# Patient Record
Sex: FEMALE | Race: WHITE | NOT HISPANIC OR LATINO | URBAN - METROPOLITAN AREA
[De-identification: names, ages, dates, MRNs, and addresses within clinical notes are randomized per-mention and may not be internally consistent; named-entity substitution may affect disease eponyms.]

---

## 2020-07-18 ENCOUNTER — TELEPHONE (OUTPATIENT)
Dept: URGENT CARE | Facility: CLINIC | Age: 30
End: 2020-07-18

## 2020-07-18 NOTE — TELEPHONE ENCOUNTER
Called patient, left a message on the cell #voicemail informing her of her negative Covid 19 test results

## 2021-02-01 ENCOUNTER — TRANSCRIBE ORDERS (OUTPATIENT)
Dept: ADMINISTRATIVE | Facility: HOSPITAL | Age: 31
End: 2021-02-01

## 2021-02-01 DIAGNOSIS — R10.32 LLQ ABDOMINAL PAIN: ICD-10-CM

## 2021-02-01 DIAGNOSIS — D72.829 LEUKOCYTOSIS, UNSPECIFIED TYPE: Primary | ICD-10-CM

## 2021-02-12 ENCOUNTER — HOSPITAL ENCOUNTER (OUTPATIENT)
Dept: CT IMAGING | Facility: HOSPITAL | Age: 31
Discharge: HOME | End: 2021-02-12
Admitting: INTERNAL MEDICINE

## 2021-02-12 DIAGNOSIS — R10.32 LLQ ABDOMINAL PAIN: ICD-10-CM

## 2021-02-12 DIAGNOSIS — D72.829 LEUKOCYTOSIS, UNSPECIFIED TYPE: ICD-10-CM

## 2021-02-12 PROCEDURE — 74177 CT ABD & PELVIS W/CONTRAST: CPT

## 2021-02-12 PROCEDURE — 25010000002 IOPAMIDOL 61 % SOLUTION: Performed by: INTERNAL MEDICINE

## 2021-02-12 PROCEDURE — 0 DIATRIZOATE MEGLUMINE & SODIUM PER 1 ML: Performed by: INTERNAL MEDICINE

## 2021-02-12 RX ADMIN — DIATRIZOATE MEGLUMINE AND DIATRIZOATE SODIUM 30 ML: 600; 100 SOLUTION ORAL; RECTAL at 13:36

## 2021-02-12 RX ADMIN — IOPAMIDOL 85 ML: 612 INJECTION, SOLUTION INTRAVENOUS at 13:36

## 2021-03-12 ENCOUNTER — CONSULT (OUTPATIENT)
Dept: ONCOLOGY | Facility: CLINIC | Age: 31
End: 2021-03-12

## 2021-03-12 ENCOUNTER — LAB (OUTPATIENT)
Dept: LAB | Facility: HOSPITAL | Age: 31
End: 2021-03-12

## 2021-03-12 VITALS
TEMPERATURE: 97.5 F | OXYGEN SATURATION: 98 % | WEIGHT: 172.5 LBS | HEART RATE: 81 BPM | HEIGHT: 64 IN | SYSTOLIC BLOOD PRESSURE: 128 MMHG | BODY MASS INDEX: 29.45 KG/M2 | DIASTOLIC BLOOD PRESSURE: 82 MMHG | RESPIRATION RATE: 16 BRPM

## 2021-03-12 DIAGNOSIS — D72.829 LEUKOCYTOSIS, UNSPECIFIED TYPE: ICD-10-CM

## 2021-03-12 DIAGNOSIS — D72.829 LEUKOCYTOSIS, UNSPECIFIED TYPE: Primary | ICD-10-CM

## 2021-03-12 LAB
BASOPHILS # BLD AUTO: 0.08 10*3/MM3 (ref 0–0.2)
BASOPHILS NFR BLD AUTO: 0.7 % (ref 0–1.5)
CRP SERPL-MCNC: 0.36 MG/DL (ref 0–0.5)
DEPRECATED RDW RBC AUTO: 41 FL (ref 37–54)
EOSINOPHIL # BLD AUTO: 0.09 10*3/MM3 (ref 0–0.4)
EOSINOPHIL NFR BLD AUTO: 0.8 % (ref 0.3–6.2)
ERYTHROCYTE [DISTWIDTH] IN BLOOD BY AUTOMATED COUNT: 13.7 % (ref 12.3–15.4)
ERYTHROCYTE [SEDIMENTATION RATE] IN BLOOD: 7 MM/HR (ref 0–20)
HCT VFR BLD AUTO: 41.4 % (ref 34–46.6)
HGB BLD-MCNC: 13.9 G/DL (ref 12–15.9)
IMM GRANULOCYTES # BLD AUTO: 0.04 10*3/MM3 (ref 0–0.05)
IMM GRANULOCYTES NFR BLD AUTO: 0.4 % (ref 0–0.5)
LYMPHOCYTES # BLD AUTO: 3.97 10*3/MM3 (ref 0.7–3.1)
LYMPHOCYTES NFR BLD AUTO: 35.6 % (ref 19.6–45.3)
MCH RBC QN AUTO: 27.9 PG (ref 26.6–33)
MCHC RBC AUTO-ENTMCNC: 33.6 G/DL (ref 31.5–35.7)
MCV RBC AUTO: 83 FL (ref 79–97)
MONOCYTES # BLD AUTO: 0.87 10*3/MM3 (ref 0.1–0.9)
MONOCYTES NFR BLD AUTO: 7.8 % (ref 5–12)
NEUTROPHILS NFR BLD AUTO: 54.7 % (ref 42.7–76)
NEUTROPHILS NFR BLD AUTO: 6.1 10*3/MM3 (ref 1.7–7)
NRBC BLD AUTO-RTO: 0 /100 WBC (ref 0–0.2)
PLATELET # BLD AUTO: 420 10*3/MM3 (ref 140–450)
PMV BLD AUTO: 9.3 FL (ref 6–12)
RBC # BLD AUTO: 4.99 10*6/MM3 (ref 3.77–5.28)
WBC # BLD AUTO: 11.15 10*3/MM3 (ref 3.4–10.8)

## 2021-03-12 PROCEDURE — 99204 OFFICE O/P NEW MOD 45 MIN: CPT | Performed by: INTERNAL MEDICINE

## 2021-03-12 PROCEDURE — 88185 FLOWCYTOMETRY/TC ADD-ON: CPT

## 2021-03-12 PROCEDURE — 85025 COMPLETE CBC W/AUTO DIFF WBC: CPT

## 2021-03-12 PROCEDURE — 86140 C-REACTIVE PROTEIN: CPT | Performed by: INTERNAL MEDICINE

## 2021-03-12 PROCEDURE — 88184 FLOWCYTOMETRY/ TC 1 MARKER: CPT

## 2021-03-12 PROCEDURE — 85652 RBC SED RATE AUTOMATED: CPT | Performed by: INTERNAL MEDICINE

## 2021-03-12 PROCEDURE — 36415 COLL VENOUS BLD VENIPUNCTURE: CPT
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
## 2021-03-15 LAB — REF LAB TEST METHOD: NORMAL

## 2021-03-16 LAB — REF LAB TEST METHOD: NORMAL

## 2021-04-13 ENCOUNTER — TELEMEDICINE (OUTPATIENT)
Dept: ONCOLOGY | Facility: CLINIC | Age: 31
End: 2021-04-13

## 2021-04-13 DIAGNOSIS — D72.829 LEUKOCYTOSIS, UNSPECIFIED TYPE: Primary | ICD-10-CM

## 2021-04-13 PROCEDURE — 99213 OFFICE O/P EST LOW 20 MIN: CPT | Performed by: INTERNAL MEDICINE
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
## 2021-04-16 ENCOUNTER — BULK ORDERING (OUTPATIENT)
Dept: CASE MANAGEMENT | Facility: OTHER | Age: 31
End: 2021-04-16

## 2021-04-16 DIAGNOSIS — Z23 IMMUNIZATION DUE: ICD-10-CM

## 2021-08-12 ENCOUNTER — TRANSCRIBE ORDERS (OUTPATIENT)
Dept: ADMINISTRATIVE | Facility: HOSPITAL | Age: 31
End: 2021-08-12

## 2021-08-12 DIAGNOSIS — R10.11 INTERMITTENT RIGHT UPPER QUADRANT ABDOMINAL PAIN: Primary | ICD-10-CM

## 2021-08-12 DIAGNOSIS — R19.7 DIARRHEA, UNSPECIFIED TYPE: ICD-10-CM

## 2021-09-08 ENCOUNTER — APPOINTMENT (OUTPATIENT)
Dept: ULTRASOUND IMAGING | Facility: HOSPITAL | Age: 31
End: 2021-09-08

## 2022-04-15 ENCOUNTER — APPOINTMENT (OUTPATIENT)
Dept: LAB | Facility: HOSPITAL | Age: 32
End: 2022-04-15

## 2023-05-23 ENCOUNTER — TRANSCRIBE ORDERS (OUTPATIENT)
Dept: ADMINISTRATIVE | Facility: HOSPITAL | Age: 33
End: 2023-05-23
Payer: COMMERCIAL

## 2023-05-23 DIAGNOSIS — R74.8 ELEVATED LIVER ENZYMES: Primary | ICD-10-CM

## 2023-05-30 ENCOUNTER — HOSPITAL ENCOUNTER (OUTPATIENT)
Dept: ULTRASOUND IMAGING | Facility: HOSPITAL | Age: 33
Discharge: HOME | End: 2023-05-30
Admitting: INTERNAL MEDICINE

## 2023-05-30 DIAGNOSIS — R74.8 ELEVATED LIVER ENZYMES: ICD-10-CM

## 2023-05-30 PROCEDURE — 76705 ECHO EXAM OF ABDOMEN: CPT

## 2025-05-16 ENCOUNTER — OFFICE VISIT (OUTPATIENT)
Dept: INTERNAL MEDICINE | Age: 35
End: 2025-05-16
Payer: COMMERCIAL

## 2025-05-16 VITALS
HEART RATE: 86 BPM | WEIGHT: 184 LBS | BODY MASS INDEX: 31.41 KG/M2 | TEMPERATURE: 97.2 F | OXYGEN SATURATION: 97 % | SYSTOLIC BLOOD PRESSURE: 132 MMHG | RESPIRATION RATE: 18 BRPM | DIASTOLIC BLOOD PRESSURE: 84 MMHG | HEIGHT: 64 IN

## 2025-05-16 DIAGNOSIS — E78.5 HYPERLIPIDEMIA, UNSPECIFIED HYPERLIPIDEMIA TYPE: Chronic | ICD-10-CM

## 2025-05-16 DIAGNOSIS — R42 INTERMITTENT LIGHTHEADEDNESS: ICD-10-CM

## 2025-05-16 DIAGNOSIS — R68.89 DECREASED EXERCISE TOLERANCE: Chronic | ICD-10-CM

## 2025-05-16 DIAGNOSIS — Z00.00 ENCOUNTER FOR PREVENTATIVE ADULT HEALTH CARE EXAMINATION: ICD-10-CM

## 2025-05-16 DIAGNOSIS — Z82.49 FAMILY HISTORY OF HEART DISEASE: Chronic | ICD-10-CM

## 2025-05-16 DIAGNOSIS — E66.811 CLASS 1 OBESITY WITH SERIOUS COMORBIDITY AND BODY MASS INDEX (BMI) OF 31.0 TO 31.9 IN ADULT, UNSPECIFIED OBESITY TYPE: Chronic | ICD-10-CM

## 2025-05-16 DIAGNOSIS — R42 DIZZY: Primary | Chronic | ICD-10-CM

## 2025-05-16 DIAGNOSIS — E55.9 VITAMIN D DEFICIENCY: ICD-10-CM

## 2025-05-16 DIAGNOSIS — Z13.6 SCREENING FOR ISCHEMIC HEART DISEASE: ICD-10-CM

## 2025-05-16 LAB — GLUCOSE BLDC GLUCOMTR-MCNC: 86 MG/DL (ref 70–130)

## 2025-05-16 NOTE — PROGRESS NOTES
LUISA VERAS PA-C                  I  N  T  E  R  N  A  L    M  E  D  I  C  I  N  E         ENCOUNTER DATE:  05/16/2025    Zhane Blank / 35 y.o. / female    OFFICE VISIT ENCOUNTER       CHIEF COMPLAINT / REASON FOR OFFICE VISIT     Establish Care and Dizziness (Started last week; /Pt states doesn't feel her self. )      ASSESSMENT & PLAN     Problem List Items Addressed This Visit       Family history of heart disease (Chronic)    Relevant Orders    CT Cardiac Calcium Score Without Dye    Class 1 obesity with serious comorbidity and body mass index (BMI) of 31.0 to 31.9 in adult (Chronic)    Decreased exercise tolerance (Chronic)    Relevant Orders    Adult Transthoracic Echo Complete W/ Cont if Necessary Per Protocol    Treadmill Stress Test    Dizzy - Primary (Chronic)    Relevant Orders    POCT Glucose (Completed)    Adult Transthoracic Echo Complete W/ Cont if Necessary Per Protocol    Duplex Carotid Ultrasound CAR     Other Visit Diagnoses         Intermittent lightheadedness        Relevant Orders    Adult Transthoracic Echo Complete W/ Cont if Necessary Per Protocol    Duplex Carotid Ultrasound CAR      Screening for ischemic heart disease        Relevant Orders    CT Cardiac Calcium Score Without Dye      Hyperlipidemia, unspecified hyperlipidemia type  (Chronic)       Relevant Orders    CT Cardiac Calcium Score Without Dye    Lipid Panel With / Chol / HDL Ratio      Encounter for preventative adult health care examination        Relevant Orders    CBC & Differential    Comprehensive Metabolic Panel    Hemoglobin A1c    TSH+Free T4    Urinalysis With Microscopic If Indicated (No Culture) - Urine, Clean Catch      Vitamin D deficiency        Relevant Orders    Vitamin D,25-Hydroxy          Orders Placed This Encounter   Procedures    CT Cardiac Calcium Score Without Dye     Standing Status:   Future     Expected Date:   5/18/2025     Expiration Date:   8/17/2026     Does  this patient have a diabetic monitoring/medication delivering device on?:   No     Release to patient:   Routine Release [[id number]]     Reason for Exam::   Screen for CAD     Patient Pregnant:   No    Comprehensive Metabolic Panel     Standing Status:   Future     Expected Date:   5/30/2025     Expiration Date:   9/29/2026     Release to patient:   Routine Release [[id number]]    Hemoglobin A1c     Standing Status:   Future     Expected Date:   5/30/2025     Expiration Date:   9/29/2026     Release to patient:   Routine Release [[id number]]    Lipid Panel With / Chol / HDL Ratio     Standing Status:   Future     Expected Date:   5/30/2025     Expiration Date:   9/29/2026     Release to patient:   Routine Release [[id number]]    TSH+Free T4     Standing Status:   Future     Expected Date:   5/30/2025     Expiration Date:   9/29/2026     Release to patient:   Routine Release [[id number]]    Urinalysis With Microscopic If Indicated (No Culture) - Urine, Clean Catch     Standing Status:   Future     Expected Date:   5/30/2025     Expiration Date:   9/29/2026     Release to patient:   Routine Release [[id number]]    Vitamin D,25-Hydroxy     Standing Status:   Future     Expected Date:   5/30/2025     Expiration Date:   8/17/2026     Release to patient:   Routine Release [[id number]]    Treadmill Stress Test     Standing Status:   Future     Expected Date:   5/18/2025     Expiration Date:   5/17/2026     Reason for exam?:   Other     Reason for Exam::   Decreased exercise tolerance     Release to patient:   Routine Release [[id number]]    POCT Glucose     Complete no more than 45 minutes prior to patient eating     Release to patient:   Routine Release [[id number]]    Adult Transthoracic Echo Complete W/ Cont if Necessary Per Protocol     Standing Status:   Future     Expected Date:   5/18/2025     Expiration Date:   5/17/2026     Reason for exam?:   Other Reasons     Other reason(s)?:   Lightheadedness, Presyncope, or  "Syncope     Release to patient:   Routine Release [[id number]]    CBC & Differential     Standing Status:   Future     Expected Date:   5/30/2025     Expiration Date:   9/29/2026     Manual Differential:   No     Release to patient:   Routine Release [[id number]]     No orders of the defined types were placed in this encounter.      SUMMARY/DISCUSSION  Establish care with complaints of intermittent dizziness/lightheadedness and decreased exercise tolerance.   POCT Glucose of 86.   Cardiac workup exams ordered.   CT Cardiac Calcium score ordered.        Next Appointment:     Return in about 3 weeks (around 6/6/2025) for Annual Exam; FASTING Labs scheduled 1 week before.      VITAL SIGNS     Vitals:    05/16/25 1408   BP: 132/84   BP Location: Left arm   Patient Position: Sitting   Cuff Size: Adult   Pulse: 86   Resp: 18   Temp: 97.2 °F (36.2 °C)   TempSrc: Temporal   SpO2: 97%   Weight: 83.5 kg (184 lb)   Height: 162 cm (63.78\")       BP Readings from Last 3 Encounters:   05/16/25 132/84   03/12/21 128/82   07/17/20 126/80     Wt Readings from Last 3 Encounters:   05/16/25 83.5 kg (184 lb)   03/12/21 78.2 kg (172 lb 8 oz)   07/17/20 72.1 kg (159 lb)     Body mass index is 31.8 kg/m².         No data to display                   MEDICATIONS AT THE TIME OF OFFICE VISIT     No current outpatient medications on file prior to visit.     No current facility-administered medications on file prior to visit.          HISTORY OF PRESENT ILLNESS     PT is a 36y/o WF with Hyperlipidemia and NAFLD who presents seeking to establish care.     Patient was previously followed with Dr. Garner, who is now medically retired.     Dizziness/Lightheadedness: Reports 11 days new-onset of intermittent dizziness regardless of position or level of exertion. She also admits decreased exercise tolerance. Denies current chest pain, SOB, nausea, vomiting, night sweats, or unexplained weight loss.    Hyperlipidemia: Reports history of hyperlipidemia, " but never necessitated medication dosing.     Hepatic Steatosis: Liver US completed in may 2023 demonstrated steatosis without convincing cirrhosis, and without mass visualized.     Leukocytosis & Thrombocytosis (Chronic): Evaluated by Dr. Nevarez of Hematology in 2021, without any diagnosed resolution.     Chronic RT Shoulder Pain: Participates in Hot Yoga and multiple days of gym workouts.     Women's Health: Total hysterectomy completed in September 2024. Followed by Dr. Mccormack of OBGYN.     Family Hx:  Hypertension, Hyperlipidemia, DM2, CAD, Mother passed from MI at age 60, Father underwent Heart transplant in his 50s.     Social: . Two Children. Employed as an Insurance .     Patient Care Team:  Michele Quiros PA-C as PCP - General (Physician Assistant)  David Mccormack MD as Consulting Physician (Obstetrics and Gynecology)    REVIEW OF SYSTEMS     Review of Systems   As Per HPI.  All other systems negative.     PHYSICAL EXAMINATION     Physical Exam  Vitals and nursing note reviewed.   Constitutional:       General: She is not in acute distress.     Appearance: Normal appearance. She is obese. She is not ill-appearing.   Cardiovascular:      Rate and Rhythm: Normal rate and regular rhythm.      Pulses: Normal pulses.      Heart sounds: Normal heart sounds. No murmur heard.     No friction rub. No gallop.   Pulmonary:      Effort: Pulmonary effort is normal. No respiratory distress.      Breath sounds: Normal breath sounds. No stridor. No wheezing.   Neurological:      Mental Status: She is alert.   Psychiatric:         Mood and Affect: Mood normal.         Behavior: Behavior normal.             REVIEWED DATA     Labs:     Lab Results   Component Value Date     11/30/2018    K 4 11/09/2022    CALCIUM 9.8 11/30/2018    AST 46 (H) 03/15/2024    ALT 18 11/30/2018    BUN 6 (L) 11/30/2018    CREATININE 0.5 (L) 11/30/2018    CREATININE 0.5 (L) 11/05/2018     Lab Results  "  Component Value Date    HGBA1C 5.4 03/30/2019   No results found for: \"LDL\", \"HDL\", \"TRIG\"No results found for: \"TSH\", \"FREET4\"  Lab Results   Component Value Date    WBC 11.89 (H) 03/15/2024    HGB 13.4 03/15/2024     (H) 03/15/2024   No results found for: \"MALBCRERATIO\"          Imaging:               Medical Tests:               Summary of old records / correspondence / consultant report:             Request outside records:       "

## 2025-05-17 PROBLEM — E66.811 CLASS 1 OBESITY WITH SERIOUS COMORBIDITY AND BODY MASS INDEX (BMI) OF 31.0 TO 31.9 IN ADULT: Chronic | Status: ACTIVE | Noted: 2025-05-17

## 2025-05-17 PROBLEM — R42 DIZZY: Chronic | Status: ACTIVE | Noted: 2025-05-17

## 2025-05-17 PROBLEM — R68.89 DECREASED EXERCISE TOLERANCE: Chronic | Status: ACTIVE | Noted: 2025-05-17

## 2025-05-17 PROBLEM — Z82.49 FAMILY HISTORY OF HEART DISEASE: Chronic | Status: ACTIVE | Noted: 2025-05-17

## 2025-05-19 ENCOUNTER — PATIENT ROUNDING (BHMG ONLY) (OUTPATIENT)
Dept: INTERNAL MEDICINE | Age: 35
End: 2025-05-19
Payer: COMMERCIAL

## 2025-05-19 NOTE — PROGRESS NOTES
A [employer] message has been sent to the patient for PATIENT ROUNDING with [facility].

## 2025-06-06 ENCOUNTER — OFFICE VISIT (OUTPATIENT)
Dept: INTERNAL MEDICINE | Age: 35
End: 2025-06-06
Payer: COMMERCIAL

## 2025-06-06 VITALS
HEIGHT: 64 IN | SYSTOLIC BLOOD PRESSURE: 122 MMHG | RESPIRATION RATE: 18 BRPM | OXYGEN SATURATION: 98 % | HEART RATE: 89 BPM | DIASTOLIC BLOOD PRESSURE: 78 MMHG | WEIGHT: 182 LBS | BODY MASS INDEX: 31.07 KG/M2 | TEMPERATURE: 95.4 F

## 2025-06-06 DIAGNOSIS — E78.5 HYPERLIPIDEMIA, UNSPECIFIED HYPERLIPIDEMIA TYPE: ICD-10-CM

## 2025-06-06 DIAGNOSIS — Z00.00 ENCOUNTER FOR PREVENTATIVE ADULT HEALTH CARE EXAMINATION: Primary | ICD-10-CM

## 2025-06-06 DIAGNOSIS — E66.811 CLASS 1 OBESITY WITH SERIOUS COMORBIDITY AND BODY MASS INDEX (BMI) OF 31.0 TO 31.9 IN ADULT, UNSPECIFIED OBESITY TYPE: ICD-10-CM

## 2025-06-06 PROCEDURE — 99395 PREV VISIT EST AGE 18-39: CPT | Performed by: PHYSICIAN ASSISTANT

## 2025-06-06 RX ORDER — SEMAGLUTIDE 0.25 MG/.5ML
0.25 INJECTION, SOLUTION SUBCUTANEOUS WEEKLY
Qty: 2 ML | Refills: 0 | Status: SHIPPED | OUTPATIENT
Start: 2025-06-06

## 2025-06-06 NOTE — PROGRESS NOTES
"                             LUISA VERAS PA-C                 I  N  T  E  R  N  A  L    M  E  D  I  C  I  N  E         ENCOUNTER DATE:  06/06/2025    Zhane Kupper / 35 y.o. / female    ANNUAL PREVENTATIVE / PHYSICAL ENCOUNTER       CHIEF COMPLAINT     Annual Exam      VITALS     Vitals:    06/06/25 1352   BP: 122/78   BP Location: Left arm   Patient Position: Sitting   Cuff Size: Adult   Pulse: 89   Resp: 18   Temp: 95.4 °F (35.2 °C)   TempSrc: Temporal   SpO2: 98%   Weight: 82.6 kg (182 lb)   Height: 162 cm (63.78\")       BP Readings from Last 3 Encounters:   06/06/25 122/78   05/16/25 132/84   03/12/21 128/82      Wt Readings from Last 3 Encounters:   06/06/25 82.6 kg (182 lb)   05/16/25 83.5 kg (184 lb)   03/12/21 78.2 kg (172 lb 8 oz)      Body mass index is 31.46 kg/m².         No data to display                   MEDICATIONS     No current outpatient medications on file prior to visit.     No current facility-administered medications on file prior to visit.         HISTORY OF PRESENT ILLNESS     Zhane presents for annual health maintenance visit.       Dizziness/Lightheadedness: Reports 11 days new-onset of intermittent dizziness regardless of position or level of exertion. She also admits decreased exercise tolerance. Denies current chest pain, SOB, nausea, vomiting, night sweats, or unexplained weight loss. Cardiac Echo and Stress test pending in July.      Hyperlipidemia: LDL of 127 and Triglycerides of 186 in May 2025.  CT Cardiac Calcium Score scheduled and pending for next week.     Obesity: Continued difficulty losing weight despite multiple lifestyle changes.  Reports eating a low carbohydrate diet and going to the gym 6 days weekly. Participates in strength training, hot yoga, and daily walks.  She is agreeable to prescription for semaglutide weekly injection.     Hepatic Steatosis: Liver US completed in may 2023 demonstrated steatosis without convincing cirrhosis, and without mass visualized. " "     Leukocytosis & Thrombocytosis (Chronic): Evaluated by Dr. Nevarez of Hematology in , without any diagnosed resolution.      Chronic RT Shoulder Pain: Participates in Hot Yoga and multiple days of gym workouts.      Women's Health: Total hysterectomy completed in 2024. Followed by Dr. Mccormack of OBGYN.      Family Hx:  Hypertension, Hyperlipidemia, DM2, CAD, Mother passed from MI at age 60, Father underwent Heart transplant in his 50s.      Social: . Two Children. Employed as an Insurance .         Patient Care Team:  Michele Quiros PA-C as PCP - General (Physician Assistant)  David Mccormack MD as Consulting Physician (Obstetrics and Gynecology)    General health: good  Lifestyle:  Attempting to lose weight?: Yes   Diet: eating healthier and low carb.  Exercise: exercises 6 days weekly  Tobacco: Never used   Alcohol: occasional/infrequent and 2 drinks/occasion  Work: Full-time  Reproductive health:  Sexually active?: Yes   Sexual problems?: No problems  Concern for STD?: No    Sees Gynecologist?: Yes   Neeru/Postmenopausal?: No   Depression Screenin/16/2025     2:25 PM   PHQ-2/PHQ-9 Depression Screening   Little interest or pleasure in doing things Not at all   Feeling down, depressed, or hopeless Not at all   How difficult have these problems made it for you to do your work, take care of things at home, or get along with other people? Not difficult at all         PHQ-2: 0 (Not depressed)   PHQ-9: 0 (Negative screening for depression)    ALLERGIES  Allergies   Allergen Reactions    Penicillins Rash     Including sores in mouth    Aminoglycosides Other (See Comments)    Sumatriptan Other (See Comments)     \"felt like passing out, falling over\"    Sulfamethoxazole-Trimethoprim Nausea And Vomiting        PFSH:     The following portions of the patient's history were reviewed and updated as appropriate: Allergies / Current Medications / Past Medical " History / Surgical History / Social History / Family History    PROBLEM LIST   Patient Active Problem List   Diagnosis    Family history of heart disease    Class 1 obesity with serious comorbidity and body mass index (BMI) of 31.0 to 31.9 in adult    Decreased exercise tolerance    Dizzy       PAST MEDICAL HISTORY  Past Medical History:   Diagnosis Date    Allergic     Headache        SURGICAL HISTORY  Past Surgical History:   Procedure Laterality Date    APPENDECTOMY  10/2003    BREAST SURGERY  2009     SECTION      HYSTERECTOMY  2024    TUBAL ABDOMINAL LIGATION         SOCIAL HISTORY  Social History     Socioeconomic History    Marital status:    Tobacco Use    Smoking status: Never     Passive exposure: Never    Smokeless tobacco: Never   Vaping Use    Vaping status: Never Used   Substance and Sexual Activity    Alcohol use: Yes     Comment: tailgating     Drug use: Never    Sexual activity: Yes     Partners: Male     Birth control/protection: None, Hysterectomy       FAMILY HISTORY  Family History   Problem Relation Age of Onset    Heart failure Mother     Hyperlipidemia Mother     Hypertension Mother     Mental illness Mother     Heart attack Mother     Heart failure Father     Hyperlipidemia Father     Hypertension Father     Hyperthyroidism Sister     No Known Problems Brother     No Known Problems Daughter     No Known Problems Son     Diabetes type II Maternal Aunt     Stomach cancer Maternal Aunt     Diabetic kidney disease Maternal Uncle     Cancer Maternal Grandmother     Alzheimer's disease Maternal Grandfather     No Known Problems Paternal Grandmother     No Known Problems Paternal Grandfather     No Known Problems Cousin     No Known Problems Other        IMMUNIZATION HISTORY  Immunization History   Administered Date(s) Administered    COVID-19 (PFIZER) Purple Cap Monovalent 2021, 2021         REVIEW OF SYSTEMS     Review of Systems    Constitutional: Negative.    HENT: Negative.     Respiratory: Negative.     Cardiovascular: Negative.    Gastrointestinal: Negative.    Genitourinary: Negative.    Musculoskeletal: Negative.    All other systems reviewed and are negative.        PHYSICAL EXAMINATION     Physical Exam  Vitals and nursing note reviewed. Exam conducted with a chaperone present.   Constitutional:       General: She is not in acute distress.     Appearance: Normal appearance. She is obese. She is not ill-appearing.   HENT:      Head: Normocephalic and atraumatic.      Right Ear: Tympanic membrane and ear canal normal. There is no impacted cerumen.      Left Ear: Tympanic membrane and ear canal normal. There is no impacted cerumen.      Nose: Nose normal.      Mouth/Throat:      Mouth: Mucous membranes are moist.      Pharynx: Oropharynx is clear. No posterior oropharyngeal erythema.   Eyes:      Extraocular Movements: Extraocular movements intact.      Pupils: Pupils are equal, round, and reactive to light.   Cardiovascular:      Rate and Rhythm: Normal rate and regular rhythm.      Pulses: Normal pulses.      Heart sounds: Normal heart sounds. No murmur heard.     No friction rub. No gallop.   Pulmonary:      Effort: Pulmonary effort is normal. No respiratory distress.      Breath sounds: Normal breath sounds. No wheezing, rhonchi or rales.   Chest:   Breasts:     Right: Normal. No mass.      Left: Normal. No mass.      Comments: Bilateral breast implants.   Abdominal:      General: Bowel sounds are normal. There is no distension.      Palpations: Abdomen is soft. There is no mass.      Tenderness: There is no abdominal tenderness. There is no guarding or rebound.   Genitourinary:     Comments: Patient defers to next gynecology visit.    Musculoskeletal:         General: No swelling, deformity or signs of injury. Normal range of motion.      Cervical back: Normal range of motion and neck supple. No rigidity or tenderness.    Lymphadenopathy:      Cervical: No cervical adenopathy.      Upper Body:      Right upper body: No supraclavicular, axillary or pectoral adenopathy.      Left upper body: No supraclavicular, axillary or pectoral adenopathy.   Skin:     General: Skin is warm and dry.      Capillary Refill: Capillary refill takes less than 2 seconds.      Coloration: Skin is not jaundiced.      Findings: No erythema, lesion or rash.   Neurological:      General: No focal deficit present.      Mental Status: She is alert and oriented to person, place, and time. Mental status is at baseline.      Cranial Nerves: Cranial nerves 2-12 are intact. No cranial nerve deficit.      Sensory: No sensory deficit.      Motor: No weakness.      Coordination: Coordination is intact. Romberg sign negative.      Gait: Gait normal.   Psychiatric:         Mood and Affect: Mood normal.         Behavior: Behavior normal.         Thought Content: Thought content normal.         Judgment: Judgment normal.         REVIEWED DATA      Labs:    Lab Results   Component Value Date     05/30/2025    K 4.6 05/30/2025    CALCIUM 9.5 05/30/2025    AST 27 05/30/2025    ALT 38 (H) 05/30/2025    BUN 9.0 05/30/2025    CREATININE 0.68 05/30/2025    CREATININE 0.5 (L) 11/30/2018    CREATININE 0.5 (L) 11/05/2018     Lab Results   Component Value Date    GLUCOSE 94 05/30/2025    HGBA1C 5.30 05/30/2025    HGBA1C 5.4 03/30/2019    TSH 0.847 05/30/2025    FREET4 1.09 05/30/2025     Lab Results   Component Value Date     (H) 05/30/2025    HDL 33 (L) 05/30/2025    TRIG 186 (H) 05/30/2025    CHOLHDLRATIO 5.85 05/30/2025     Lab Results   Component Value Date    IVUK13JL 22.8 (L) 05/30/2025      Lab Results   Component Value Date    WBC 11.86 (H) 05/30/2025    HGB 12.3 05/30/2025    MCV 82.5 05/30/2025     (H) 05/30/2025     Lab Results   Component Value Date    PROTEIN Negative 05/30/2025    GLUCOSEU Negative 05/30/2025    BLOODU Negative 05/30/2025     "NITRITEU Negative 05/30/2025    LEUKOCYTESUR Negative 05/30/2025     Lab Results   Component Value Date    HEPCVIRUSABY Nonreactive 11/20/2018       Imaging:                Medical Tests:              Summary of old records / correspondence / consultant report:               Request outside records:         ASSESSMENT & PLAN     ANNUAL WELLNESS EXAM / PHYSICAL     Other medical problems addressed today:  Problem List Items Addressed This Visit       Class 1 obesity with serious comorbidity and body mass index (BMI) of 31.0 to 31.9 in adult (Chronic)    Overview   Wegovy 0.25 mg/0.5 mL injected once weekly.      Counseled on how to improve diet including: reducing portion sizes of meals, eating fewer pre-packaged food items (Snacks & Candies), eating of more vegetables, eating fewer carbohydrates, eating fewer fried foods, eating less than 2.5gm sodium daily, drinking fewer sugary drinks, and eating less protein.   Make dinner the lightest meal of the day, and ensure to always include a vegetable.  Set realistic and short-term goals (instead of saying: \"I am go to work out more\", say instead: \"I will begin walking 15 minutes every day\").  Exercise most days of the week: Beginning with a 15-minute daily walk each evening. Strive for a minimum of 30 minutes of vigorous, aerobic exercise 3 to 5 days per week.  Working out with a partner increases compliance.  Monitor progress of workouts, and use a diet journal.           Relevant Medications    Semaglutide-Weight Management (Wegovy) 0.25 MG/0.5ML solution auto-injector     Other Visit Diagnoses         Encounter for preventative adult health care examination    -  Primary      Hyperlipidemia, unspecified hyperlipidemia type        Relevant Medications    Semaglutide-Weight Management (Wegovy) 0.25 MG/0.5ML solution auto-injector            No orders of the defined types were placed in this encounter.       Summary/Discussion:     Obesity: Start semaglutide 0.25 mg / " 0.5 mL injected once weekly.  Return for review in 1 month.    Next Appointment with me:   Return in about 4 weeks (around 7/4/2025) for Obesity follow up.      HEALTHCARE MAINTENANCE ISSUES     Cancer Screening:  Colon: Initial/Next screening at age: 46 and complted at age 23.   Repeat colon cancer screening: N/A at this time  Breast: Recommended monthly self exams; annual professional exam  Mammogram: N/A at this time  Cervical: pelvic exam as recommended by gyne  Skin: Monthly self skin examination, annual exam by health professional  Lung: Does not meet criteria for lung cancer screening.   Other:    Screening Labs & Tests:  Lab results reviewed & discussed with the patient or test orders placed today.  EKG:  CV Screening: Lipid panel  DEXA (65+ or postmenopausal with risk factors):   HEP C (If born [birth year]31-[birth year], or risk factors): Previously had negative screen  Other:     Immunization/Vaccinations (to be given today unless deferred by patient)  Influenza: Patient deferred/declined flu vaccine (recommended annual vaccination)  Hepatitis A: Not needed at this time  Hepatitis B: Up to date  Tetanus/Pertussis: Up to date  Pneumococcal: Not needed at this time  Shingles: Not needed at this time  COVID: Does not plan to get the latest booster  RSV: Not indicated    Lifestyle Counseling:  Lifestyle Modifications: Attempt to lose weight, Continue good lifestyle choices/modifications, Improve dietary compliance, Begin progressive aerobic exercise program 3-5 days a week, Maintain a low sugar/carbohydrate diet, Follow a low fat, low cholesterol diet, Maintain low sodium diet (< 3 gm) for blood pressure, Make dinner the lightest meal of day, Improve sleep hygiene, and Reduce exposure to stress if possible  Safety Issues: Always wear seatbelt, Avoid texting while driving   Use sunscreen, regular skin examination  Recommended annual dental/vision examination.  Emotional/Stress/Sleep: Reviewed and  given when  appropriate      Health Maintenance   Topic Date Due    ANNUAL PHYSICAL  Never done    COVID-19 Vaccine (3 - 2024-25 season) 09/01/2024    INFLUENZA VACCINE  07/01/2025    LIPID PANEL  05/30/2026    TDAP/TD VACCINES (3 - Td or Tdap) 03/05/2029    HEPATITIS C SCREENING  Completed    Pneumococcal Vaccine 0-49  Aged Out

## 2025-06-12 ENCOUNTER — PRIOR AUTHORIZATION (OUTPATIENT)
Dept: INTERNAL MEDICINE | Age: 35
End: 2025-06-12
Payer: COMMERCIAL

## 2025-06-15 ENCOUNTER — HOSPITAL ENCOUNTER (OUTPATIENT)
Facility: HOSPITAL | Age: 35
Discharge: HOME | End: 2025-06-15
Admitting: PHYSICIAN ASSISTANT

## 2025-06-15 DIAGNOSIS — E78.5 HYPERLIPIDEMIA, UNSPECIFIED HYPERLIPIDEMIA TYPE: Chronic | ICD-10-CM

## 2025-06-15 DIAGNOSIS — Z82.49 FAMILY HISTORY OF HEART DISEASE: Chronic | ICD-10-CM

## 2025-06-15 DIAGNOSIS — Z13.6 SCREENING FOR ISCHEMIC HEART DISEASE: ICD-10-CM

## 2025-06-15 PROCEDURE — 75571 CT HRT W/O DYE W/CA TEST: CPT

## 2025-06-18 ENCOUNTER — TELEPHONE (OUTPATIENT)
Dept: INTERNAL MEDICINE | Age: 35
End: 2025-06-18

## 2025-07-25 ENCOUNTER — TELEPHONE (OUTPATIENT)
Dept: INTERNAL MEDICINE | Age: 35
End: 2025-07-25

## 2025-08-12 DIAGNOSIS — E66.811 CLASS 1 OBESITY WITH SERIOUS COMORBIDITY AND BODY MASS INDEX (BMI) OF 31.0 TO 31.9 IN ADULT, UNSPECIFIED OBESITY TYPE: ICD-10-CM

## 2025-08-12 DIAGNOSIS — E78.5 HYPERLIPIDEMIA, UNSPECIFIED HYPERLIPIDEMIA TYPE: ICD-10-CM

## 2025-08-12 RX ORDER — SEMAGLUTIDE 0.25 MG/.5ML
0.25 INJECTION, SOLUTION SUBCUTANEOUS WEEKLY
Qty: 2 ML | Refills: 0 | Status: SHIPPED | OUTPATIENT
Start: 2025-08-12

## 2025-08-14 ENCOUNTER — PRIOR AUTHORIZATION (OUTPATIENT)
Dept: INTERNAL MEDICINE | Age: 35
End: 2025-08-14
Payer: COMMERCIAL